# Patient Record
Sex: FEMALE | Race: WHITE | NOT HISPANIC OR LATINO | Employment: OTHER | URBAN - METROPOLITAN AREA
[De-identification: names, ages, dates, MRNs, and addresses within clinical notes are randomized per-mention and may not be internally consistent; named-entity substitution may affect disease eponyms.]

---

## 2019-10-04 NOTE — PATIENT DISCUSSION
PT UNDERSTANDS OPTIONS AND AND DESIRES TO CONSIDER RLE: DSV VS. MF. PT TO MEET W/ COUNSELOR TO DISC PRICING. SCHEDULE RLE MEASUREMENT WITH DR. GERRY LUJAN.

## 2019-10-04 NOTE — PATIENT DISCUSSION
CATARACTS, OU- NOT VISUALLY SIGNIFICANT. DISC OPT OF GLS/HVIR-SI-OXRSWY-VS-REFRACTIVE SX TO REDUCE DEPENDENCY ON GLS.

## 2019-10-04 NOTE — PATIENT DISCUSSION
REFRACTIVE ERROR, OU - PT. IS A GOOD CANDIDATE FOR LASIK OR RLE. DISC PROS/CONS OF EACH PROCEDURE TO INCLUDE READERS, GLARE, AND LONGEVITY OF EACH TREATMENT.

## 2022-04-15 ENCOUNTER — NEW PATIENT (OUTPATIENT)
Dept: URBAN - METROPOLITAN AREA CLINIC 35 | Facility: CLINIC | Age: 82
End: 2022-04-15

## 2022-04-15 DIAGNOSIS — H52.7: ICD-10-CM

## 2022-04-15 DIAGNOSIS — H40.033: ICD-10-CM

## 2022-04-15 PROCEDURE — 92004 COMPRE OPH EXAM NEW PT 1/>: CPT

## 2022-04-15 PROCEDURE — 92015 DETERMINE REFRACTIVE STATE: CPT

## 2022-04-15 ASSESSMENT — VISUAL ACUITY
OS_SC: J6
OD_SC: 20/70
OD_CC: J4
OD_SC: J6
OU_SC: J6
OU_SC: 20/60+1
OS_CC: 20/70
OU_CC: 20/70
OD_CC: 20/70+2
OU_CC: J1
OD_PH: 20/60
OS_PH: 20/50
OS_CC: J3
OS_SC: 20/60

## 2022-04-15 ASSESSMENT — TONOMETRY
OD_IOP_MMHG: 19
OS_IOP_MMHG: 22

## 2022-05-03 ENCOUNTER — CONSULTATION/EVALUATION (OUTPATIENT)
Dept: URBAN - METROPOLITAN AREA CLINIC 43 | Facility: CLINIC | Age: 82
End: 2022-05-03

## 2022-05-03 DIAGNOSIS — H25.813: ICD-10-CM

## 2022-05-03 DIAGNOSIS — H18.513: ICD-10-CM

## 2022-05-03 DIAGNOSIS — H40.033: ICD-10-CM

## 2022-05-03 DIAGNOSIS — H35.363: ICD-10-CM

## 2022-05-03 PROCEDURE — 92250 FUNDUS PHOTOGRAPHY W/I&R: CPT

## 2022-05-03 PROCEDURE — V2799PMN IMPRIMIS PRED-MOXI-NEPAF 5ML

## 2022-05-03 PROCEDURE — 92025-3 CORNEAL TOPO, REFUSED

## 2022-05-03 PROCEDURE — 92134 CPTRZ OPH DX IMG PST SGM RTA: CPT

## 2022-05-03 PROCEDURE — 92136TC INTERFEROMETRY - TECHNICAL COMPONENT

## 2022-05-03 PROCEDURE — 92132 CPTRZD OPH DX IMG ANT SGM: CPT

## 2022-05-03 PROCEDURE — 99214 OFFICE O/P EST MOD 30 MIN: CPT

## 2022-05-03 PROCEDURE — 92286 ANT SGM IMG I&R SPECLR MIC: CPT

## 2022-05-03 ASSESSMENT — VISUAL ACUITY
OD_SC: J6
OD_CC: J4
OS_CC: J3
OD_SC: 20/70
OS_SC: J6
OD_CC: 20/70-2
OS_SC: 20/60
OS_CC: 20/80

## 2022-05-03 ASSESSMENT — TONOMETRY
OS_IOP_MMHG: 16
OD_IOP_MMHG: 16

## 2022-05-12 ENCOUNTER — SURGERY/PROCEDURE (OUTPATIENT)
Dept: URBAN - METROPOLITAN AREA CLINIC 35 | Facility: CLINIC | Age: 82
End: 2022-05-12

## 2022-05-12 ENCOUNTER — ESTABLISHED PATIENT (OUTPATIENT)
Dept: URBAN - METROPOLITAN AREA SURGERY 14 | Facility: SURGERY | Age: 82
End: 2022-05-12

## 2022-05-12 DIAGNOSIS — H25.813: ICD-10-CM

## 2022-05-12 DIAGNOSIS — H18.513: ICD-10-CM

## 2022-05-12 DIAGNOSIS — H35.363: ICD-10-CM

## 2022-05-12 DIAGNOSIS — H40.033: ICD-10-CM

## 2022-05-12 PROCEDURE — 99211HP H&P OFFICE/OUTPATIENT VISIT, EST

## 2022-05-12 PROCEDURE — 66984 XCAPSL CTRC RMVL W/O ECP: CPT

## 2022-05-13 ENCOUNTER — POST-OP (OUTPATIENT)
Dept: URBAN - METROPOLITAN AREA CLINIC 35 | Facility: CLINIC | Age: 82
End: 2022-05-13

## 2022-05-13 DIAGNOSIS — H18.513: ICD-10-CM

## 2022-05-13 DIAGNOSIS — H40.033: ICD-10-CM

## 2022-05-13 DIAGNOSIS — H35.363: ICD-10-CM

## 2022-05-13 DIAGNOSIS — Z96.1: ICD-10-CM

## 2022-05-13 DIAGNOSIS — H25.811: ICD-10-CM

## 2022-05-13 PROCEDURE — 99024 POSTOP FOLLOW-UP VISIT: CPT

## 2022-05-13 ASSESSMENT — TONOMETRY: OS_IOP_MMHG: 21

## 2022-05-13 ASSESSMENT — VISUAL ACUITY: OS_SC: 20/200

## 2022-05-16 ENCOUNTER — POST OP/EVAL OF SECOND EYE (OUTPATIENT)
Dept: URBAN - METROPOLITAN AREA CLINIC 35 | Facility: CLINIC | Age: 82
End: 2022-05-16

## 2022-05-16 DIAGNOSIS — H18.513: ICD-10-CM

## 2022-05-16 DIAGNOSIS — H35.363: ICD-10-CM

## 2022-05-16 DIAGNOSIS — H40.033: ICD-10-CM

## 2022-05-16 DIAGNOSIS — Z96.1: ICD-10-CM

## 2022-05-16 DIAGNOSIS — H25.811: ICD-10-CM

## 2022-05-16 PROCEDURE — 99024 POSTOP FOLLOW-UP VISIT: CPT

## 2022-05-16 ASSESSMENT — TONOMETRY
OD_IOP_MMHG: 17
OS_IOP_MMHG: 16

## 2022-05-16 ASSESSMENT — VISUAL ACUITY
OD_SC: 20/70-2
OS_SC: 20/80
OS_PH: 20/70

## 2022-05-19 ENCOUNTER — ESTABLISHED PATIENT (OUTPATIENT)
Dept: URBAN - METROPOLITAN AREA SURGERY 14 | Facility: SURGERY | Age: 82
End: 2022-05-19

## 2022-05-19 ENCOUNTER — SURGERY/PROCEDURE (OUTPATIENT)
Dept: URBAN - METROPOLITAN AREA CLINIC 35 | Facility: CLINIC | Age: 82
End: 2022-05-19

## 2022-05-19 DIAGNOSIS — H40.033: ICD-10-CM

## 2022-05-19 DIAGNOSIS — H35.363: ICD-10-CM

## 2022-05-19 DIAGNOSIS — H57.03: ICD-10-CM

## 2022-05-19 DIAGNOSIS — H25.811: ICD-10-CM

## 2022-05-19 DIAGNOSIS — H25.89: ICD-10-CM

## 2022-05-19 DIAGNOSIS — Z96.1: ICD-10-CM

## 2022-05-19 DIAGNOSIS — H18.513: ICD-10-CM

## 2022-05-19 PROCEDURE — 99211HP H&P OFFICE/OUTPATIENT VISIT, EST

## 2022-05-19 PROCEDURE — 66982 XCAPSL CTRC RMVL CPLX WO ECP: CPT

## 2022-05-20 ENCOUNTER — POST-OP (OUTPATIENT)
Dept: URBAN - METROPOLITAN AREA CLINIC 35 | Facility: CLINIC | Age: 82
End: 2022-05-20

## 2022-05-20 DIAGNOSIS — H25.811: ICD-10-CM

## 2022-05-20 DIAGNOSIS — Z96.1: ICD-10-CM

## 2022-05-20 PROCEDURE — 99024 POSTOP FOLLOW-UP VISIT: CPT

## 2022-05-20 ASSESSMENT — VISUAL ACUITY
OS_PH: 20/50-1
OS_SC: 20/50-2
OU_SC: 20/40-2
OD_PH: 20/50-1
OD_SC: 20/50-1

## 2022-05-20 ASSESSMENT — TONOMETRY
OS_IOP_MMHG: 18
OD_IOP_MMHG: 20

## 2022-06-13 ENCOUNTER — POST-OP (OUTPATIENT)
Dept: URBAN - METROPOLITAN AREA CLINIC 35 | Facility: CLINIC | Age: 82
End: 2022-06-13

## 2022-06-13 DIAGNOSIS — Z96.1: ICD-10-CM

## 2022-06-13 DIAGNOSIS — H25.811: ICD-10-CM

## 2022-06-13 PROCEDURE — 99024 POSTOP FOLLOW-UP VISIT: CPT

## 2022-06-13 ASSESSMENT — TONOMETRY
OS_IOP_MMHG: 14
OD_IOP_MMHG: 13

## 2022-06-13 ASSESSMENT — VISUAL ACUITY
OU_SC: 20/30-1
OD_SC: 20/30-2
OS_SC: 20/40-2

## 2022-10-03 ENCOUNTER — COMPREHENSIVE EXAM (OUTPATIENT)
Dept: URBAN - METROPOLITAN AREA CLINIC 35 | Facility: CLINIC | Age: 82
End: 2022-10-03

## 2022-10-03 DIAGNOSIS — H52.7: ICD-10-CM

## 2022-10-03 DIAGNOSIS — Z96.1: ICD-10-CM

## 2022-10-03 DIAGNOSIS — H18.513: ICD-10-CM

## 2022-10-03 DIAGNOSIS — H35.363: ICD-10-CM

## 2022-10-03 DIAGNOSIS — D31.31: ICD-10-CM

## 2022-10-03 DIAGNOSIS — H25.811: ICD-10-CM

## 2022-10-03 PROCEDURE — 92014 COMPRE OPH EXAM EST PT 1/>: CPT

## 2022-10-03 PROCEDURE — 92015 DETERMINE REFRACTIVE STATE: CPT

## 2022-10-03 ASSESSMENT — VISUAL ACUITY
OD_SC: 20/50-2
OS_SC: 20/50-1
OS_SC: J10
OD_SC: J10
OS_PH: 20/40
OU_SC: J12
OD_PH: 20/40
OU_SC: 20/40

## 2022-10-03 ASSESSMENT — TONOMETRY
OS_IOP_MMHG: 16
OD_IOP_MMHG: 15

## 2023-12-21 ENCOUNTER — COMPREHENSIVE EXAM (OUTPATIENT)
Dept: URBAN - METROPOLITAN AREA CLINIC 35 | Facility: CLINIC | Age: 83
End: 2023-12-21

## 2023-12-21 DIAGNOSIS — Z79.4: ICD-10-CM

## 2023-12-21 DIAGNOSIS — D31.31: ICD-10-CM

## 2023-12-21 DIAGNOSIS — E11.9: ICD-10-CM

## 2023-12-21 DIAGNOSIS — H35.3131: ICD-10-CM

## 2023-12-21 DIAGNOSIS — H52.7: ICD-10-CM

## 2023-12-21 DIAGNOSIS — H18.513: ICD-10-CM

## 2023-12-21 PROCEDURE — 92014 COMPRE OPH EXAM EST PT 1/>: CPT

## 2023-12-21 PROCEDURE — 92015 DETERMINE REFRACTIVE STATE: CPT

## 2023-12-21 PROCEDURE — 92134 CPTRZ OPH DX IMG PST SGM RTA: CPT

## 2023-12-21 ASSESSMENT — VISUAL ACUITY
OS_CC: 20/40
OD_CC: 20/40
OU_CC: J1
OD_CC: J1
OS_CC: J1
OU_CC: 20/30+2

## 2023-12-21 ASSESSMENT — TONOMETRY
OS_IOP_MMHG: 18
OD_IOP_MMHG: 15

## 2025-01-06 ENCOUNTER — COMPREHENSIVE EXAM (OUTPATIENT)
Age: 85
End: 2025-01-06

## 2025-01-06 DIAGNOSIS — H35.3131: ICD-10-CM

## 2025-01-06 DIAGNOSIS — E11.9: ICD-10-CM

## 2025-01-06 DIAGNOSIS — D31.31: ICD-10-CM

## 2025-01-06 DIAGNOSIS — Z79.4: ICD-10-CM

## 2025-01-06 DIAGNOSIS — H18.513: ICD-10-CM

## 2025-01-06 DIAGNOSIS — H52.7: ICD-10-CM

## 2025-01-06 PROCEDURE — 92134 CPTRZ OPH DX IMG PST SGM RTA: CPT

## 2025-01-06 PROCEDURE — 92015 DETERMINE REFRACTIVE STATE: CPT

## 2025-01-06 PROCEDURE — 92014 COMPRE OPH EXAM EST PT 1/>: CPT
